# Patient Record
(demographics unavailable — no encounter records)

---

## 2025-03-10 NOTE — DISCUSSION/SUMMARY
[FreeTextEntry1] : Well appearing  female with hx of left breast cancer is here for gyn exam; c/o vulvar vaginal irritation, no PMB since 2023 (she reports hx of D&C with dr. Stuart which was benign). VE c/w vaginal atrophy /dryness, no abnormal lesions noted, no signs of vaginitis. pt is not sexually active.   Discussed vaginal atrophy and treatment options, hand out given. pt will need clearance from Dr. Vaughan if she desires estrace / yuvafem treatment   -UTD with PAP  ( reviewed cervical cancer screenings; no longer screening, no hx of abnormal paps)  - UTD with Mammogram - Has appt for consult for colonoscopy scheduled - Healthy diet and routine weight bearing exercises advised, reviewed bone health - SBE recommended - PMB precautions reviewed.   RTO as directed for routine gyn exam and PRN

## 2025-03-10 NOTE — COUNSELING
[Nutrition/ Exercise/ Weight Management] : nutrition, exercise, weight management [Vitamins/Supplements] : vitamins/supplements [Breast Self Exam] : breast self exam [Bladder Hygiene] : bladder hygiene [FreeTextEntry2] : Discussed vulvovaginal atrophy / GSM and treatment options: recommend vulvovaginal moisturizers 2-3 x per week, discussed Revaree, Replens, Coconut oil, vitamin E oil etc, also reviewed Estrace cream / Estring/Yuvafem. R/B/A. (patient hand out provided).  Recommend vitamin D; adequate calcium intake, weight bearing exercises; Fall precautions and fracture prevention discussed.

## 2025-03-10 NOTE — HISTORY OF PRESENT ILLNESS
[Patient reported mammogram was normal] : Patient reported mammogram was normal [Patient reported PAP Smear was normal] : Patient reported PAP Smear was normal [LMP unknown] : LMP unknown [N] : Patient is not sexually active [Y] : Positive pregnancy history [postmenopausal] : postmenopausal [FreeTextEntry1] : 67-year-old P2 female with history of breast cancer ( s/p left breast lumpectomy 12/2022) is here as a new patient for a routine gynecological exam; patient previously saw Dr. Stuart at [facility] in [place]. She c/o vulvar/vaginal itching and now irritation that comes and goes, she is not sure if she has a UTI or something else, but she has noticed worsen more recently. She denies any recent PMB, pelvic or acute breast concerns. States she is not sexually active.  [Mammogramdate] : 8/2024 [TextBox_19] : Dr. Vaughan at [facility] ---> hx of left breast cancer  [PapSmeardate] : 2023 [TextBox_37] : sent by oncology [ColonoscopyDate] : ~2020 [TextBox_43] : due this year, has appt for consult [LMPDate] : Age 54yr [PGHxTotal] : 2 [[facility]] : 2 [[facility]] : 2

## 2025-03-10 NOTE — PHYSICAL EXAM
[Appropriately responsive] : appropriately responsive [Alert] : alert [No Acute Distress] : no acute distress [Regular Rate Rhythm] : regular rate rhythm [Soft] : soft [Non-tender] : non-tender [Non-distended] : non-distended [No Lesions] : no lesions [No Mass] : no mass [Oriented x3] : oriented x3 [FreeTextEntry5] : Respirations even and unlabored.  [FreeTextEntry6] :  symmetric bilaterally, dense breasts, scar noted left breast, no palpable masses, no skin changes or dimpling, no nipple discharge, no adenopathy [FreeTextEntry1] : Labia/Clitoris: atrophic, no lesions. Vagina: atrophic, no lesions visible or palpable. no abnormal discharge Cervix: Smooth, pale pink, no lesions. No cervical motion tenderness. Uterus: normal size, mid-position, mobile, nontender. Adnexa: No palpable adnexal masses, nontender bilaterally.

## 2025-03-10 NOTE — REVIEW OF SYSTEMS
[Negative] : Heme/Lymph [Genital Rash/Irritation] : genital rash/irritation [Frequency] : frequency [Urgency] : no urgency [Dysuria] : no dysuria [Abn Vaginal bleeding] : no abnormal vaginal bleeding [Pelvic pain] : no pelvic pain

## 2025-03-10 NOTE — HISTORY OF PRESENT ILLNESS
[Patient reported mammogram was normal] : Patient reported mammogram was normal [Patient reported PAP Smear was normal] : Patient reported PAP Smear was normal [LMP unknown] : LMP unknown [N] : Patient is not sexually active [Y] : Positive pregnancy history [postmenopausal] : postmenopausal [FreeTextEntry1] : 67-year-old P2 female with history of breast cancer ( s/p left breast lumpectomy 12/2022) is here as a new patient for a routine gynecological exam; patient previously saw Dr. Stuart at [facility] in [place]. She c/o vulvar/vaginal itching and now irritation that comes and goes, she is not sure if she has a UTI or something else, but she has noticed worsen more recently. She denies any recent PMB, pelvic or acute breast concerns. States she is not sexually active.  [Mammogramdate] : 8/2024 [TextBox_19] : Dr. Vaughan at [facility] ---> hx of left breast cancer  [PapSmeardate] : 2023 [TextBox_37] : sent by oncology [ColonoscopyDate] : ~2020 [TextBox_43] : due this year, has appt for consult [LMPDate] : Age 54yr [PGHxTotal] : 2 [[facility]] : 2 [[facility]] : 2